# Patient Record
(demographics unavailable — no encounter records)

---

## 2024-11-13 NOTE — DISCUSSION/SUMMARY
[FreeTextEntry1] : 9 y/o F w/ presentation concerning for acute URI and b/l AOM  Plan: 1. Amoxicillin as prescribed 2. COVID-19 RAT POSITIVE 3. Supportive care with Tylenol/Motrin PRN, increased fluids, keeping head elevated and rest. Present to ED if she develops any chest pain or difficulty breathing. Quarantine until symptoms resolve. 4. Monitor and return with any new or worsening symptoms.  -- Considering history noted above and child with parent noting some SOB on exertion, recommend cardiology referral at this time. Mother understands and is agreeable.

## 2024-11-13 NOTE — HISTORY OF PRESENT ILLNESS
[de-identified] : STUFFY NOSE, FEVER, SORE THROAT, COUGH, LOSS OF APPETITE [FreeTextEntry6] : 9 y/o F complaining of cough, subjective fever and nasal congestion since last night. Endorses post-tussive vomiting. Denies any SOB. Tolerating fluids and eating with decreased appetite.  Medical hx: Child hospitalized in 2022 in [facility] for Dengue Fever and COVID-19 and mother notes that child had "fluid in the heart". Was told to f/u with cardiology. Mother notes that child was reevaluated by cardiology in [facility] who told them echo performed was normal but had recommended continuing to follow with cardiology, however, has not followed with cardiology since. Mother notes that child gets some SOB with exertion. Only episode of syncope was during the time that child was diagnosed with Dengue and COVID-19, no syncope otherwise.

## 2024-11-13 NOTE — HISTORY OF PRESENT ILLNESS
[de-identified] : STUFFY NOSE, FEVER, SORE THROAT, COUGH, LOSS OF APPETITE [FreeTextEntry6] : 9 y/o F complaining of cough, subjective fever and nasal congestion since last night. Endorses post-tussive vomiting. Denies any SOB. Tolerating fluids and eating with decreased appetite.  Medical hx: [facility] and mother notes that child had "fluid in the heart". Was told to f/u with cardiology. Mother notes that [facility] who told them echo performed was normal but had recommended continuing to follow with cardiology, however, has not followed with cardiology since. Mother notes that child gets some SOB with exertion. Only episode of syncope was during the time that child was diagnosed with Dengue and COVID-19, no syncope otherwise.